# Patient Record
Sex: FEMALE | Race: WHITE | NOT HISPANIC OR LATINO | Employment: FULL TIME | URBAN - METROPOLITAN AREA
[De-identification: names, ages, dates, MRNs, and addresses within clinical notes are randomized per-mention and may not be internally consistent; named-entity substitution may affect disease eponyms.]

---

## 2018-11-29 ENCOUNTER — CONVERSION ENCOUNTER (OUTPATIENT)
Dept: GENERAL RADIOLOGY | Facility: HOSPITAL | Age: 43
End: 2018-11-29

## 2021-06-23 ENCOUNTER — TRANSCRIBE ORDERS (OUTPATIENT)
Dept: ADMINISTRATIVE | Facility: HOSPITAL | Age: 46
End: 2021-06-23

## 2021-06-23 DIAGNOSIS — Z12.31 ENCOUNTER FOR SCREENING MAMMOGRAM FOR BREAST CANCER: Primary | ICD-10-CM

## 2024-09-10 ENCOUNTER — APPOINTMENT (OUTPATIENT)
Dept: GENERAL RADIOLOGY | Facility: HOSPITAL | Age: 49
End: 2024-09-10
Payer: COMMERCIAL

## 2024-09-10 ENCOUNTER — APPOINTMENT (OUTPATIENT)
Dept: CT IMAGING | Facility: HOSPITAL | Age: 49
End: 2024-09-10
Payer: COMMERCIAL

## 2024-09-10 ENCOUNTER — HOSPITAL ENCOUNTER (EMERGENCY)
Facility: HOSPITAL | Age: 49
Discharge: HOME | End: 2024-09-10
Attending: EMERGENCY MEDICINE | Admitting: EMERGENCY MEDICINE
Payer: COMMERCIAL

## 2024-09-10 VITALS
WEIGHT: 151.9 LBS | OXYGEN SATURATION: 98 % | SYSTOLIC BLOOD PRESSURE: 168 MMHG | HEIGHT: 63 IN | TEMPERATURE: 98.4 F | DIASTOLIC BLOOD PRESSURE: 120 MMHG | BODY MASS INDEX: 26.91 KG/M2 | HEART RATE: 74 BPM | RESPIRATION RATE: 18 BRPM

## 2024-09-10 DIAGNOSIS — I10 HYPERTENSION NOT AT GOAL: Primary | ICD-10-CM

## 2024-09-10 DIAGNOSIS — R42 LIGHTHEADEDNESS: ICD-10-CM

## 2024-09-10 LAB
ALBUMIN SERPL-MCNC: 3.7 G/DL (ref 3.5–5.2)
ALBUMIN/GLOB SERPL: 1.2 G/DL
ALP SERPL-CCNC: 64 U/L (ref 39–117)
ALT SERPL W P-5'-P-CCNC: 10 U/L (ref 1–33)
ANION GAP SERPL CALCULATED.3IONS-SCNC: 8.9 MMOL/L (ref 5–15)
AST SERPL-CCNC: 17 U/L (ref 1–32)
BASOPHILS # BLD AUTO: 0.04 10*3/MM3 (ref 0–0.2)
BASOPHILS NFR BLD AUTO: 0.5 % (ref 0–1.5)
BILIRUB SERPL-MCNC: 0.3 MG/DL (ref 0–1.2)
BILIRUB UR QL STRIP: NEGATIVE
BUN SERPL-MCNC: 11 MG/DL (ref 6–20)
BUN/CREAT SERPL: 12.8 (ref 7–25)
CALCIUM SPEC-SCNC: 9 MG/DL (ref 8.6–10.5)
CHLORIDE SERPL-SCNC: 106 MMOL/L (ref 98–107)
CLARITY UR: CLEAR
CO2 SERPL-SCNC: 24.1 MMOL/L (ref 22–29)
COLOR UR: YELLOW
CREAT SERPL-MCNC: 0.86 MG/DL (ref 0.57–1)
DEPRECATED RDW RBC AUTO: 40.4 FL (ref 37–54)
EGFRCR SERPLBLD CKD-EPI 2021: 84 ML/MIN/1.73
EOSINOPHIL # BLD AUTO: 0.11 10*3/MM3 (ref 0–0.4)
EOSINOPHIL NFR BLD AUTO: 1.5 % (ref 0.3–6.2)
ERYTHROCYTE [DISTWIDTH] IN BLOOD BY AUTOMATED COUNT: 12.8 % (ref 12.3–15.4)
GLOBULIN UR ELPH-MCNC: 3.2 GM/DL
GLUCOSE BLDC GLUCOMTR-MCNC: 124 MG/DL (ref 70–99)
GLUCOSE BLDC GLUCOMTR-MCNC: 77 MG/DL (ref 70–99)
GLUCOSE SERPL-MCNC: 115 MG/DL (ref 65–99)
GLUCOSE UR STRIP-MCNC: NEGATIVE MG/DL
HCT VFR BLD AUTO: 42.1 % (ref 34–46.6)
HGB BLD-MCNC: 13.8 G/DL (ref 12–15.9)
HGB UR QL STRIP.AUTO: NEGATIVE
HOLD SPECIMEN: NORMAL
HOLD SPECIMEN: NORMAL
IMM GRANULOCYTES # BLD AUTO: 0.03 10*3/MM3 (ref 0–0.05)
IMM GRANULOCYTES NFR BLD AUTO: 0.4 % (ref 0–0.5)
KETONES UR QL STRIP: NEGATIVE
LEUKOCYTE ESTERASE UR QL STRIP.AUTO: NEGATIVE
LYMPHOCYTES # BLD AUTO: 1.81 10*3/MM3 (ref 0.7–3.1)
LYMPHOCYTES NFR BLD AUTO: 24.3 % (ref 19.6–45.3)
MAGNESIUM SERPL-MCNC: 2.1 MG/DL (ref 1.6–2.6)
MCH RBC QN AUTO: 28.5 PG (ref 26.6–33)
MCHC RBC AUTO-ENTMCNC: 32.8 G/DL (ref 31.5–35.7)
MCV RBC AUTO: 87 FL (ref 79–97)
MONOCYTES # BLD AUTO: 0.44 10*3/MM3 (ref 0.1–0.9)
MONOCYTES NFR BLD AUTO: 5.9 % (ref 5–12)
NEUTROPHILS NFR BLD AUTO: 5.03 10*3/MM3 (ref 1.7–7)
NEUTROPHILS NFR BLD AUTO: 67.4 % (ref 42.7–76)
NITRITE UR QL STRIP: NEGATIVE
NRBC BLD AUTO-RTO: 0 /100 WBC (ref 0–0.2)
PH UR STRIP.AUTO: 6.5 [PH] (ref 5–8)
PLATELET # BLD AUTO: 250 10*3/MM3 (ref 140–450)
PMV BLD AUTO: 9.8 FL (ref 6–12)
POTASSIUM SERPL-SCNC: 4.2 MMOL/L (ref 3.5–5.2)
PROT SERPL-MCNC: 6.9 G/DL (ref 6–8.5)
PROT UR QL STRIP: NEGATIVE
QT INTERVAL: 380 MS
QTC INTERVAL: 418 MS
RBC # BLD AUTO: 4.84 10*6/MM3 (ref 3.77–5.28)
SODIUM SERPL-SCNC: 139 MMOL/L (ref 136–145)
SP GR UR STRIP: 1.01 (ref 1–1.03)
TROPONIN T SERPL HS-MCNC: <6 NG/L
UROBILINOGEN UR QL STRIP: NORMAL
WBC NRBC COR # BLD AUTO: 7.46 10*3/MM3 (ref 3.4–10.8)
WHOLE BLOOD HOLD COAG: NORMAL
WHOLE BLOOD HOLD SPECIMEN: NORMAL

## 2024-09-10 PROCEDURE — 83735 ASSAY OF MAGNESIUM: CPT

## 2024-09-10 PROCEDURE — 82948 REAGENT STRIP/BLOOD GLUCOSE: CPT

## 2024-09-10 PROCEDURE — 99284 EMERGENCY DEPT VISIT MOD MDM: CPT

## 2024-09-10 PROCEDURE — 71045 X-RAY EXAM CHEST 1 VIEW: CPT

## 2024-09-10 PROCEDURE — 81003 URINALYSIS AUTO W/O SCOPE: CPT

## 2024-09-10 PROCEDURE — 70450 CT HEAD/BRAIN W/O DYE: CPT

## 2024-09-10 PROCEDURE — 84484 ASSAY OF TROPONIN QUANT: CPT

## 2024-09-10 PROCEDURE — 36415 COLL VENOUS BLD VENIPUNCTURE: CPT

## 2024-09-10 PROCEDURE — 80053 COMPREHEN METABOLIC PANEL: CPT

## 2024-09-10 PROCEDURE — 25810000003 SODIUM CHLORIDE 0.9 % SOLUTION: Performed by: EMERGENCY MEDICINE

## 2024-09-10 PROCEDURE — 96360 HYDRATION IV INFUSION INIT: CPT

## 2024-09-10 PROCEDURE — 93005 ELECTROCARDIOGRAM TRACING: CPT

## 2024-09-10 PROCEDURE — 93005 ELECTROCARDIOGRAM TRACING: CPT | Performed by: EMERGENCY MEDICINE

## 2024-09-10 PROCEDURE — 85025 COMPLETE CBC W/AUTO DIFF WBC: CPT

## 2024-09-10 RX ORDER — LISINOPRIL 10 MG/1
10 TABLET ORAL DAILY
Qty: 30 TABLET | Refills: 1 | Status: SHIPPED | OUTPATIENT
Start: 2024-09-10

## 2024-09-10 RX ORDER — LISINOPRIL 10 MG/1
10 TABLET ORAL ONCE
Status: COMPLETED | OUTPATIENT
Start: 2024-09-10 | End: 2024-09-10

## 2024-09-10 RX ORDER — LINACLOTIDE 145 UG/1
145 CAPSULE, GELATIN COATED ORAL
COMMUNITY

## 2024-09-10 RX ORDER — CARIPRAZINE 3 MG/1
1 CAPSULE, GELATIN COATED ORAL DAILY
COMMUNITY
Start: 2024-08-15

## 2024-09-10 RX ORDER — DOXAZOSIN 2 MG/1
2 TABLET ORAL
COMMUNITY
Start: 2024-07-31

## 2024-09-10 RX ORDER — SODIUM CHLORIDE 0.9 % (FLUSH) 0.9 %
10 SYRINGE (ML) INJECTION AS NEEDED
Status: DISCONTINUED | OUTPATIENT
Start: 2024-09-10 | End: 2024-09-10 | Stop reason: HOSPADM

## 2024-09-10 RX ORDER — BUPROPION HYDROCHLORIDE 150 MG/1
150 TABLET ORAL EVERY MORNING
COMMUNITY
Start: 2024-09-09

## 2024-09-10 RX ORDER — ALPRAZOLAM 1 MG
1 TABLET ORAL 3 TIMES DAILY
COMMUNITY
Start: 2024-08-15

## 2024-09-10 RX ORDER — ONDANSETRON 4 MG/1
4 TABLET, FILM COATED ORAL EVERY 8 HOURS PRN
COMMUNITY
Start: 2024-05-09

## 2024-09-10 RX ORDER — LISDEXAMFETAMINE DIMESYLATE 50 MG
50 CAPSULE ORAL EVERY MORNING
COMMUNITY
Start: 2024-08-20

## 2024-09-10 RX ORDER — UBROGEPANT 100 MG/1
100 TABLET ORAL ONCE AS NEEDED
COMMUNITY
Start: 2024-06-17 | End: 2025-06-12

## 2024-09-10 RX ADMIN — SODIUM CHLORIDE 1000 ML: 9 INJECTION, SOLUTION INTRAVENOUS at 12:44

## 2024-09-10 RX ADMIN — LISINOPRIL 10 MG: 10 TABLET ORAL at 14:00

## 2024-09-10 NOTE — ED PROVIDER NOTES
Time: 12:34 PM EDT  Date of encounter:  9/10/2024  Independent Historian/Clinical History and Information was obtained by:   Patient    History is limited by: N/A    Chief Complaint: Lightheaded, numbness, weakness, elevated blood pressure      History of Present Illness:  Patient is a 49 y.o. year old female who presents to the emergency department for evaluation of lightheaded, numbness, weakness, elevated blood pressure.  Patient states that she does have felt well for couple days now.  Does have a history of Crohn's, bipolar, anxiety.  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  Does have a history of elevated blood pressure in the past but is not on current medication.  States that she is just felt a little bit off for the last couple days as well as had some numbness and tingling in her hands.  Does have a history of Raynaud's and anxiety and does occasionally get numbness and tingling in her hands.  States though she just had not felt well and her blood pressure was up and she came to the hospital to be evaluated.      Patient Care Team  Primary Care Provider: Mari Lobo MD    Past Medical History:     Allergies   Allergen Reactions    Sumatriptan Anaphylaxis    Hydrocodone Itching     Past Medical History:   Diagnosis Date    Anxiety     Bipolar 1 disorder     Crohn disease     Migraine      History reviewed. No pertinent surgical history.  History reviewed. No pertinent family history.    Home Medications:  Prior to Admission medications    Medication Sig Start Date End Date Taking? Authorizing Provider   ALPRAZolam (XANAX) 1 MG tablet Take 1 tablet by mouth 3 times a day. 8/15/24  Yes ProviderTony MD   buPROPion XL (WELLBUTRIN XL) 150 MG 24 hr tablet Take 1 tablet by mouth Every Morning. 9/9/24  Yes ProviderTony MD   doxazosin (CARDURA) 2 MG tablet Take 1 tablet by mouth every night at bedtime. 7/31/24  Yes Provider  "MD Tony   ondansetron (ZOFRAN) 4 MG tablet Take 1 tablet by mouth Every 8 (Eight) Hours As Needed. 5/9/24  Yes Tony Vieira MD   Ubrelvy 100 MG tablet Take 1 tablet by mouth 1 (One) Time As Needed. 6/17/24 6/12/25 Yes Tony Vieira MD   Vraylar 3 MG capsule capsule Take 1 capsule by mouth Daily. 8/15/24  Yes Tony Vieira MD   Vyvanse 50 MG capsule Take 1 capsule by mouth Every Morning 8/20/24  Yes Tony Vieira MD   Linzess 145 MCG capsule capsule Take 1 capsule by mouth Every Morning Before Breakfast.    ProviderTony MD        Social History:   Social History     Tobacco Use    Smoking status: Never   Substance Use Topics    Alcohol use: Not Currently         Review of Systems:  Review of Systems   Neurological:  Positive for light-headedness and numbness.   Psychiatric/Behavioral:  The patient is nervous/anxious.         Physical Exam:  BP (!) 182/110 (BP Location: Right arm, Patient Position: Lying)   Pulse 68   Temp 98.4 °F (36.9 °C) (Oral)   Resp 21   Ht 160 cm (63\")   Wt 68.9 kg (151 lb 14.4 oz)   SpO2 100%   BMI 26.91 kg/m²     Physical Exam  Vitals and nursing note reviewed.   Constitutional:       Appearance: Normal appearance.   HENT:      Head: Normocephalic and atraumatic.   Eyes:      General: No scleral icterus.  Cardiovascular:      Rate and Rhythm: Normal rate and regular rhythm.      Heart sounds: Normal heart sounds.   Pulmonary:      Effort: Pulmonary effort is normal.      Breath sounds: Normal breath sounds.   Abdominal:      Palpations: Abdomen is soft.      Tenderness: There is no abdominal tenderness.   Musculoskeletal:         General: Normal range of motion.      Cervical back: Normal range of motion.   Skin:     Findings: No rash.   Neurological:      General: No focal deficit present.      Mental Status: She is alert and oriented to person, place, and time.      Cranial Nerves: No cranial nerve deficit.      Sensory: No sensory " deficit.      Motor: No weakness.                  Procedures:  Procedures      Medical Decision Making:      Comorbidities that affect care:    Anxiety, Hypertension    External Notes reviewed:    Reviewed note from 5/9/2024      The following orders were placed and all results were independently analyzed by me:  Orders Placed This Encounter   Procedures    XR Chest 1 View    CT Head Without Contrast    [place] Draw    Comprehensive Metabolic Panel    Single High Sensitivity Troponin T    Magnesium    Urinalysis With Microscopic If Indicated (No Culture) - Urine, Clean Catch    CBC Auto Differential    NPO Diet NPO Type: Strict NPO    Undress & Gown    Continuous Pulse Oximetry    Vital Signs    Orthostatic Blood Pressure    Oxygen Therapy- Nasal Cannula; Titrate 1-6 LPM Per SpO2; 90 - 95%    POC Glucose Once    POC Glucose Once    ECG 12 Lead ED Triage Standing Order; Weak / Dizzy / AMS    Insert Peripheral IV    Fall Precautions    CBC & Differential    Green Top (Gel)    Lavender Top    Gold Top - SST    Light Blue Top       Medications Given in the Emergency Department:  Medications   sodium chloride 0.9 % flush 10 mL (has no administration in time range)   sodium chloride 0.9 % bolus 1,000 mL (0 mL Intravenous Stopped 9/10/24 1344)   lisinopril (PRINIVIL,ZESTRIL) tablet 10 mg (10 mg Oral Given 9/10/24 1400)        ED Course:    ED Course as of 09/10/24 1610   Tue Sep 10, 2024   1234 EKG interpreted by me  Time: 1036  Heart rate 72  Sinus, normal QRS, normal axis, no acute ischemia [MA]      ED Course User Index  [MA] Luis Prince MD       Labs:    Lab Results (last 24 hours)       Procedure Component Value Units Date/Time    POC Glucose Once [[id number]]  (Abnormal) Collected: 09/10/24 1044    Specimen: Blood Updated: 09/10/24 1045     Glucose 124 mg/dL      Comment: Serial Number: [id number]:  [id number]       CBC & Differential [[id number]]  (Normal) Collected: 09/10/24 1052    Specimen: Blood  from Arm, Left Updated: 09/10/24 1101    Narrative:      The following orders were created for panel order CBC & Differential.  Procedure                               Abnormality         Status                     ---------                               -----------         ------                     CBC Auto Differential[[id number]]        Normal              Final result                 Please view results for these tests on the individual orders.    Comprehensive Metabolic Panel [[id number]]  (Abnormal) Collected: 09/10/24 1052    Specimen: Blood from Arm, Left Updated: 09/10/24 1120     Glucose 115 mg/dL      BUN 11 mg/dL      Creatinine 0.86 mg/dL      Sodium 139 mmol/L      Potassium 4.2 mmol/L      Comment: Slight hemolysis detected by analyzer. Result may be falsely elevated.        Chloride 106 mmol/L      CO2 24.1 mmol/L      Calcium 9.0 mg/dL      Total Protein 6.9 g/dL      Albumin 3.7 g/dL      ALT (SGPT) 10 U/L      AST (SGOT) 17 U/L      Alkaline Phosphatase 64 U/L      Total Bilirubin 0.3 mg/dL      Globulin 3.2 gm/dL      A/G Ratio 1.2 g/dL      BUN/Creatinine Ratio 12.8     Anion Gap 8.9 mmol/L      eGFR 84.0 mL/min/1.73     Narrative:      GFR Normal >60  Chronic Kidney Disease <60  Kidney Failure <15      Single High Sensitivity Troponin T [[id number]]  (Normal) Collected: 09/10/24 1052    Specimen: Blood from Arm, Left Updated: 09/10/24 1120     HS Troponin T <6 ng/L     Narrative:      High Sensitive Troponin T Reference Range:  <14.0 ng/L- Negative Female for AMI  <22.0 ng/L- Negative Male for AMI  >=14 - Abnormal Female indicating possible myocardial injury.  >=22 - Abnormal Male indicating possible myocardial injury.   Clinicians would have to utilize clinical acumen, EKG, Troponin, and serial changes to determine if it is an Acute Myocardial Infarction or myocardial injury due to an underlying chronic condition.         Magnesium [[id number]]  (Normal) Collected: 09/10/24 1052    Specimen:  Blood from Arm, Left Updated: 09/10/24 1120     Magnesium 2.1 mg/dL     CBC Auto Differential [[id number]]  (Normal) Collected: 09/10/24 1052    Specimen: Blood from Arm, Left Updated: 09/10/24 1101     WBC 7.46 10*3/mm3      RBC 4.84 10*6/mm3      Hemoglobin 13.8 g/dL      Hematocrit 42.1 %      MCV 87.0 fL      MCH 28.5 pg      MCHC 32.8 g/dL      RDW 12.8 %      RDW-SD 40.4 fl      MPV 9.8 fL      Platelets 250 10*3/mm3      Neutrophil % 67.4 %      Lymphocyte % 24.3 %      Monocyte % 5.9 %      Eosinophil % 1.5 %      Basophil % 0.5 %      Immature Grans % 0.4 %      Neutrophils, Absolute 5.03 10*3/mm3      Lymphocytes, Absolute 1.81 10*3/mm3      Monocytes, Absolute 0.44 10*3/mm3      Eosinophils, Absolute 0.11 10*3/mm3      Basophils, Absolute 0.04 10*3/mm3      Immature Grans, Absolute 0.03 10*3/mm3      nRBC 0.0 /100 WBC     Urinalysis With Microscopic If Indicated (No Culture) - Urine, Clean Catch [[id number]]  (Normal) Collected: 09/10/24 1109    Specimen: Urine, Clean Catch Updated: 09/10/24 1116     Color, UA Yellow     Appearance, UA Clear     pH, UA 6.5     Specific Gravity, UA 1.009     Glucose, UA Negative     Ketones, UA Negative     Bilirubin, UA Negative     Blood, UA Negative     Protein, UA Negative     Leuk Esterase, UA Negative     Nitrite, UA Negative     Urobilinogen, UA 0.2 E.U./dL    Narrative:      Urine microscopic not indicated.    POC Glucose Once [[id number]]  (Normal) Collected: 09/10/24 1251    Specimen: Blood Updated: 09/10/24 1253     Glucose 77 mg/dL      Comment: Serial Number: [id number]:  [id number]                Imaging:    CT Head Without Contrast    Result Date: 9/10/2024  CT HEAD WO CONTRAST Date of Exam: 9/10/2024 3:18 PM EDT Indication: lightheaded, weakness headache. Comparison: None available. Technique: Axial CT images were obtained of the head without contrast administration.  Reconstructed coronal and sagittal images were also obtained. Automated exposure  control and iterative construction methods were used. Findings: There is no evidence of acute territorial infarction. There is no acute intracranial hemorrhage. There are no extra-axial collections. Ventricles and CSF spaces are symmetric. No mass effect nor hydrocephalus. Brain parenchyma appears normal for age.  Paranasal sinuses and mastoid air cells are adequately aerated.  Osseous structures and orbits appear intact.     Impression: No acute process is identified. Electronically Signed: Jose Asif MD  9/10/2024 3:22 PM EDT  Workstation ID: [id number]    XR Chest 1 View    Result Date: 9/10/2024  XR CHEST 1 VW Date of Exam: 9/10/2024 11:38 AM EDT Indication: Weak/Dizzy/AMS triage protocol Comparison: None available. Findings: The heart size is normal and the lungs are clear     Impression: Negative single view chest. Electronically Signed: Baudilio Lau MD  9/10/2024 11:41 AM EDT  Workstation ID: [id number]       Differential Diagnosis and Discussion:    Dizziness: Based on the patient's history, signs, and symptoms, the diffential diagnosis includes but is not limited to meningitis, stroke, sepsis, subarachnoid hemorrhage, intracranial bleeding, encephalitis, vertigo, electrolyte imbalance, and metabolic disorders.  Metabolic: Differential diagnosis includes but is not limited to hypertension, hyperglycemia, hyperkalemia, hypocalcemia, metabolic acidosis, hypokalemia, hypoglycemia, malnutrition, hypothyroidism, hyperthyroidism, and adrenal insufficiency.   Weakness: Based on the patient's history, signs, and symptoms, the diffential diagnosis includes but is not limited to meningitis, stroke, sepsis, subarachnoid hemorrhage, intracranial bleeding, encephalitis, acute uti, dehydration, MS, myasthenia gravis, Guillan Pearcy, migraine variant, neuromuscular disorders vertigo, electrolyte imbalance, and metabolic disorders.    All labs were reviewed and interpreted by me.  All X-rays impressions were independently  interpreted by me.  EKG was interpreted by me.  CT scan radiology impression was interpreted by me.    MDM     Amount and/or Complexity of Data Reviewed  Clinical lab tests: reviewed  Tests in the radiology section of CPT®: reviewed  Tests in the medicine section of CPT®: reviewed         Patient is a 49-year-old female who presents with complaints of lightheadedness, numbness, weakness, elevated blood pressure.  Labs and imaging here did not show any significant findings.  She reported that her blood sugar was also low when she talk to me on discharge.  She states it was in the 50s.  Here it is in the 120s and normal.  I do not think that is the cause of her symptoms.  She did have significantly elevated blood pressure throughout her stay here in the emergency room.  I did give her a dose of lisinopril with some improvement in her blood pressure.  This could be the cause of some of her issues.  According to triage note she also had a recent diagnosis of strep and has been on antibiotics.  She could just be not feeling well from recent strep infection.  I do not see any signs of sepsis at this time.  Will place on lisinopril as an outpatient until the patient follow-up as an outpatient with her primary care doctor.  Should she have new or worsening symptoms return to the emergency room.  Will DC.              Patient Care Considerations:          Consultants/Shared Management Plan:    None    Social Determinants of Health:    Patient is independent, reliable, and has access to care.       Disposition and Care Coordination:    Discharged: The patient is suitable and stable for discharge with no need for consideration of admission.    I have explained the patient´s condition, diagnoses and treatment plan based on the information available to me at this time. I have answered questions and addressed any concerns. The patient has a good  understanding of the patient´s diagnosis, condition, and treatment plan as can be  expected at this point. The vital signs have been stable. The patient´s condition is stable and appropriate for discharge from the emergency department.      The patient will pursue further outpatient evaluation with the primary care physician or other designated or consulting physician as outlined in the discharge instructions. They are agreeable to this plan of care and follow-up instructions have been explained in detail. The patient has received these instructions in written format and have expressed an understanding of the discharge instructions. The patient is aware that any significant change in condition or worsening of symptoms should prompt an immediate return to this or the closest emergency department or call to 911.      Final diagnoses:   Hypertension not at goal   Lightheadedness        ED Disposition       ED Disposition   Discharge    Condition   Stable    Comment   --               This medical record created using voice recognition software.             Luis Prince MD  09/10/24 1142

## 2024-09-20 LAB
QT INTERVAL: 380 MS
QTC INTERVAL: 418 MS